# Patient Record
Sex: FEMALE | Race: WHITE | NOT HISPANIC OR LATINO | Employment: FULL TIME
[De-identification: names, ages, dates, MRNs, and addresses within clinical notes are randomized per-mention and may not be internally consistent; named-entity substitution may affect disease eponyms.]

---

## 2021-04-08 ENCOUNTER — TRANSCRIBE ORDERS (OUTPATIENT)
Dept: OTHER | Age: 66
End: 2021-04-08

## 2021-04-08 DIAGNOSIS — C54.1 ENDOMETRIAL CANCER (H): Primary | ICD-10-CM

## 2023-11-17 ENCOUNTER — TRANSCRIBE ORDERS (OUTPATIENT)
Dept: OTHER | Age: 68
End: 2023-11-17

## 2023-11-17 DIAGNOSIS — C54.1 ENDOMETRIAL CANCER (H): Primary | ICD-10-CM

## 2023-11-20 ENCOUNTER — PRE VISIT (OUTPATIENT)
Dept: ONCOLOGY | Facility: CLINIC | Age: 68
End: 2023-11-20
Payer: COMMERCIAL

## 2023-11-20 ENCOUNTER — PATIENT OUTREACH (OUTPATIENT)
Dept: ONCOLOGY | Facility: CLINIC | Age: 68
End: 2023-11-20
Payer: COMMERCIAL

## 2023-11-20 NOTE — TELEPHONE ENCOUNTER
RECORDS STATUS - ALL OTHER DIAGNOSIS    Referring Provider/Location:  Claudette Castillo Np, NP   Dx and Code: Endometrial cancer (H) [C54.1]   Appt Date:  12.28.23  Provider: Josette Mehta  Records Requested  TJ   Clinic name Comments/Action Taken   [facility] December 20, 2023 12:44 PM    Faxed request for pathology slides Case: [id number] and  Case: [id number]    Tracking #:[id number]   [facility] December 20, 2023 12:44 PM    Faxed request for all imaging to be pushed to PACS     Imaging Received  December 20, 2023  1:27 PM  NIKKI   Action: Images from [facility] received and resolved to PACS.  Call to Zachary in film room to resolve mammo     RECORDS RECEIVED FROM:     Appt Date: TBD NN WQ    Action    Action Taken 11/20/2023 8:42AM KEB     I faxed a request for imaging       NOTES STATUS DETAILS   OFFICE NOTE from referring provider Complete  Referred by CHRIS Castillo to Dr Josette Mehta for enlarging nodule, h/o endometrial cancer  P: [phone]  F: [phone]    OFFICE NOTE from medical oncologist Complete [facility]   DISCHARGE SUMMARY from hospital     DISCHARGE REPORT from the ER     CLINICAL TRIAL TREATMENTS TO DATE     LABS     PATHOLOGY REPORTS External Biopsy - [facility]  3/22/2021  Case: [id number]   A.  Uterus, cervix, bilateral fallopian tubes and ovaries (170 g), hysterectomy and bilateral salpingo-oophorectomy:  Endometrioid adenocarcinoma    1/27/2021  A.  Endometrium, endometrial curettings and polyp, curettage:    Endometrioid adenocarcinoma,   ANYTHING RELATED TO DIAGNOSIS Complete Labs last updated on 11/16/2023 in CE   GENONOMIC TESTING     TYPE:     IMAGING (NEED IMAGES & REPORT)     CT SCANS     MRI     MAMMO     ULTRASOUND Requested-   OBGYN Pelvic/GYN US 12/10/2020     OBGYN Pelvic/ GYN US 6/5/2017   PET

## 2023-11-20 NOTE — PROGRESS NOTES
New Patient Hematology / Oncology Nurse Navigator Note     Referral Date: 11/17/23    Referring provider: Claudette Castillo, TOM at     Referring Clinic/Organization: [facility] / [facility]      Referred to: GynOn    Requested provider (if applicable): Dr. Mehta, transfer of care from [facility]    Evaluation for : Endometrial Cancer     Clinical History (per Nurse review of records provided):    Appt today with TOM at  (pending)--BOOKMARKED  8/21/23 Office Visit with GynOnc TOM at -- BOOKMARKED  3/29/23 Office Visit with Dr. Mehta--BOOKMARKED  11/16/23 CT CAP:  IMPRESSION:   1. Minimal interval growth of a soft tissue nodule overlying the right psoas.   2.  Stable multiple pulmonary nodules without new or enlarging nodules.   3.  Diffuse hepatic steatosis. -- BOOKMARKED      Clinical Assessment / Barriers to Care (Per Nurse):  Pt lives in [place], [place]    Records Location: Care Everywhere     Records Needed:   Records from  per protocol (do NOT need to request path)    Additional testing needed prior to consult:   N/A    Referral updates and Plan:   OUTGOING CALL to pt:  Introduced my role as nurse navigator with [facility] Hematology/Oncology dept and that we have recd the referral for transfer of care from Dr. Mehta at  to [facility] for her Endometrial Cancer.    Pt confirms they are aware of the referral, but currently driving and would like a call back later to schedule.     -Explained to pt that they will receive a call from our scheduling intake team and provided our call-back number below if needed.          Aminata Martinez, BSN, RN, PHN, OCN  Hematology/Oncology Nurse Navigator  [facility]  [phone]

## 2023-12-26 PROBLEM — C54.1 ENDOMETRIAL CANCER (H): Status: ACTIVE | Noted: 2023-12-26

## 2023-12-26 NOTE — PATIENT INSTRUCTIONS
SCHEDULING:  -Abdomen, pelvic CT in 3 months. --already ordered by gyn onc NP Claudette Castillo.  -RTC in 3 months for surveillance (MD, in-person). 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  Treatment History:  -3/22/21: Pelvic exam under anesthesia, total laparoscopic hysterectomy (removal of uterus/cervix), bilateral salpingo-oophorectomy (removal of bilateral ovaries & fallopian tubes), attempted sentinel lymph node mapping (identification of the first lymph nodes to which the uterus drains), bilateral pelvic & para-aortic lymphadenectomy (lymph node sampling), pelvic washings.  -4/26/21-5/28/21: Adjuvant pelvic radiation therapy via intensity modulated radiation therapy.       PLAN:  1) Endometrial cancer: Overall the psoas lesion is stable (1 mm increase). Recommend continued surveillance. Plan as follows:  -RTC in 3 months with repeat PET/CT to evaluate the psoas lesion.   -Please call in the interim if you have worsening/new pelvic pain or other concerning symptoms.     2) Genetics: Tumor testing showed normal expression of mismatch repair proteins, making the genetic cancer syndrome Lee Syndrome unlikely.   -s/p Genetic counseling, genetic testing available if you choose.     3) Healthcare maintenance: Continue routine healthcare maintenance with your primary care provider.  -You are due for screening for colon cancer.   -You no longer need screening Pap/HPV tests since your cervix has been removed.       Please call with any questions or concerns. [phone]       Josette Mehta MD, MS, FACOG, FACS  12/29/2023  12:57 PM

## 2023-12-26 NOTE — PROGRESS NOTES
Consult Note  Gynecologic Oncology Clinic      Referring provider/Gyn Onc NP:    Claudette Castillo, NP  [address] [place] [address]      Primary Care Provider:  Aníbal Martin  [address]  [place] [address]       Medical Oncologist:   Srinivas Rosenberg MD  [address]  [address]      Radiation Oncologist:  Denise Townsend MD  [address]  [address]          Patient: Aileen Jin  : 1955  Language: English   Pronouns: she/her/hers       Date of Visit: Dec 29, 2023       Reason for visit: Stage IB, Grade 1 endometrial cancer. Follow-up psoas nodule.       History of Present Illness:  Aileen Jin is a 68 year old patient seen at the [facility] gyn onc clinic as a transfer of care from [facility] Gyn Onc clinic for management of endometrial cancer. Medical history significant for breast cancer, JOANN since . 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    17: Bilateral partial mastectomy.    : Adjuvant radiation to the left breast.    2017-22: Endocrine therapy with exemestane 25 mg daily.    2017-Present: JOANN.       Endometrial Cancer History:  3/22/21: Pelvic exam under anesthesia, total laparoscopic hysterectomy, bilateral salpingo-oophorectomy, attempted sentinel lymph node mapping, bilateral pelvic & para-aortic lymphadenectomy, pelvic washings.  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    21-21: Adjuvant pelvic radiation therapy via IMRT with total  dose 5000 cGy in 25 fractions.  -5/5/22: Abdomen, pelvic CT to evaluate RLQ discomfort: Indeterminate pelvic/psoas nodule.  -8/4/22: Chest, abdomen, pelvic CT: Stable nodule. I have personally reviewed the CT images.  LYMPH NODES: Stable 10 x 9 mm lymph node in the right lower quadrant anterior to the right psoas muscle.  MUSCULOSKELETAL: No suspicious osseous lesions. There is a spiculated soft tissue mass in the upper posterior left breast abutting the pectoralis major, measuring approximately 1.7 x 1.2 cm. No definite correlate seen on the comparison mammogram. There is skin thickening of the left breast which likely represents postradiation change.  -8/8/22: Left breast ultrasound:  Sonographic evaluation of the left upper breast was performed. At the 10:00 position, 7 cm from the nipple an irregular, hypoechoic area with associated posterior acoustic shadowing extends to the skin surface and is without associated internal vascularity. This finding is most consistent with postsurgical scar.  -11/16/22: PET/CT: Stable right psoas nodule with FDG-avidity concerning for recurrent, metastatic disease. I have personally reviewed the PET/CT images.  FDG avid nodule overlying the right psoas muscle measuring 0.9 x 0.6 cm (max SUV 6.8) suspicious for an isolated metastasis.  Mildly FDG avid left breast skin/subcutaneous thickening (max SUV 3.2) likely representing posttreatment inflammatory change.  -Discussed at the [facility] gyn onc tumor conference. Recommended surveillance without treatment given stability since 5/2022, and lack of symptoms related to the nodule.  -4/20/23: PET/CT: Decreasing right psoas nodule.   Decreased size/metabolic activity of the isolated soft tissue nodule overlying the right psoas muscle measuring 0.6 x 0.5 cm (max SUV 4.8, previously measured 0.9 x 0.6 cm with a max SUV of 6.8).   -8/2/23: Abdomen, pelvic CT: Stable psoas nodule.   MUSCULOSKELETAL: 1.0 x 0.7 cm soft tissue nodule immediately  anterior to the right psoas muscle (previously 1.0 x 0.6 cm).   -11/16/23: Chest, abdomen, pelvic CT: Stable to slightly increased psoas nodule. I have personally reviewed and interpreted the CT images.    LYMPH NODES: Minimal interval growth of a nodule sitting anterior to the right psoas muscle which measures 11 x 11 mm (previously 9 x 9 mm).     Genetic/Genomic Testing History:  3/22/21: Tumor IHC for mismatch repair proteins: pMMR: MSH2, MLH1, MSH6, PMS2=intact nuclear expression.        Subjective:  Aileen Jin presents to the gyn onc clinic today as a transfer of care from my [facility] clinic, unaccompanied.  Aileen reports feeling well overall. She has no pelvic/abdominal pain.   Pt. Is feeling generally well. She denies vaginal bleeding or discharge. She has had a discomfort on and off in her right lower abdomen for many years prior to her surgery with no new pain. Normal bowel and bladder habits. Her appetite is good. No cough, SOB or LE edema. She continues to work and is working from home.       Medical History:  Past Medical History:   Diagnosis Date    Breast cancer (H) 2017    IDC    Breast neoplasm, Tis (LCIS), right 05/24/2017    Compression fracture of thoracic vertebra (H)     Endometrial cancer (H) 03/22/2021    Stage IB, Grade 1 endometrioid adenocarcinoma    HTN (hypertension)          Surgical History:  Past Surgical History:   Procedure Laterality Date    BREAST BIOPSY, RT/LT Left 2014    Fibrocystic change    BREAST BIOPSY, RT/LT Left 2016    Fibrocystic change    BREAST BIOPSY, RT/LT Left 2017    [facility]    BREAST BIOPSY, RT/LT Right 2017    Papilloma and LCIS    BREAST CYST EXCISION Left 2015    BREAST CYST EXCISION Right 2017    Papilloma and LCIS    BREAST LUMPECTOMY, RT/LT Left 2017    [facility]    HYSTEROSCOPY      LAPAROSCOPIC HYSTERECTOMY TOTAL, SRI SALPINGO-OOPHORECTOMY, NODE DISSECTION, TUMOR STAGING, COMBINED  03/22/2021    For endometrial cancer: TLH, BSO, bilateral pelvic &  "para-aortic lymphadenectomy          Gynecologic History:  Menstrual/Menopause status: Menopause age 55y. S/p hysterectomy for endometrial cancer as per HPI.   Hormone therapy/Contraception status: none; history of AI as per HPI.   History of abnormal cervical cancer screening:  None.  History of STIS: None.  Current sexual activity status:  Sexually active.       Obstetric History:  OB History    Para Term  AB Living   5 2 2 0 3 2   SAB IAB Ectopic Multiple Live Births   0 0 0 0 2      # Outcome Date GA Lbr Bao/2nd Weight Sex Delivery Anes PTL Lv   5 AB            4 AB            3 AB            2 Term      Vag-Spont      1 Term      Vag-Spont             Healthcare Maintenance:   Last cervical cancer screenin/3/20 Result: AGC, hrHPV- due to endometrial cancer  Last breast cancer screenin23 Result: Benign  Last colon cancer screening: \"long time ago\" Result: Normal (per patient report)  HPV vaccination status: Unvaccinated      Medications:   Current Outpatient Medications   Medication    amLODIPine (NORVASC) 2.5 MG tablet    atenolol (TENORMIN) 50 MG tablet    ciprofloxacin (CILOXAN) 0.3 % ophthalmic solution    erythromycin (ROMYCIN) 5 MG/GM ophthalmic ointment    amLODIPine (NORVASC) 5 MG tablet    Multiple Vitamins-Minerals (PRESERVISION AREDS) CAPS     No current facility-administered medications for this visit.          Allergies:   No Known Allergies       Social History:  Patient lives with her .   Work status: . Activity: No activity limitations. Takes care of her young grandchildren.  Advanced Directives: None. Desired Healthcare Power of : her  Krystian Jin.  Social History     Tobacco Use    Smoking status: Never    Smokeless tobacco: Never   Substance Use Topics    Alcohol use: Not Currently    Drug use: Never          Family History:  Family history is significant for a first-degree relative with breast cancer.   No known family " history of ovarian, uterine, colon, urothelial/renal, prostate or pancreatic cancers.  No known family history of melanoma.   Family History   Problem Relation Age of Onset    Cancer Father         Eye cancer    Breast Cancer Sister          Physical Exam:   BP (!) 164/85 (BP Location: Right arm, Patient Position: Sitting, Cuff Size: Adult Large)   Pulse 64   Resp 16   Wt 96.5 kg (212 lb 12.8 oz)   SpO2 98%   There is no height or weight on file to calculate BMI.     General Appearance: healthy and alert, no distress     HEENT:  no thyromegaly, no palpable nodules or masses        Cardiovascular: regular rate and rhythm, no gallops, rubs or murmurs     Respiratory: lungs clear, no rales, rhonchi or wheezes, normal diaphragmatic excursion    Musculoskeletal: extremities non tender and without edema    Skin: no lesions or rashes     Neurological: normal gait, no gross defects     Psychiatric: appropriate mood and affect                               Hematological: normal cervical, supraclavicular and inguinal lymph nodes     Gastrointestinal:       abdomen soft, non-tender, non-distended, no organomegaly or masses    Genitourinary: External genitalia and urethral meatus appears normal.  Vagina is smooth without nodularity or masses.  Cervix absent.  Bimanual exam reveal no masses, nodularity or fullness.  Recto-vaginal exam confirms these findings.   Verbal consent obtained from the patient for the pelvic exam.  Each step of the exam was verbalized throughout.   Present for the exam: GILBERT Martínez.       Laboratory Examination:  12/18/23 CBC: WBC 7.4, hemoglobin 13.4, platelets 238.   12/18/23 Hemoglobin A1C: 6.5%  11/16/23 Creatinine: 0.7.   9/8/23 Liver panel: Albumin 3.7. ALT 64. Remainder of liver enzymes within normal limits.       Radiographic Examination:  I have independently reviewed & interpreted the 11/16/23 CT images detailed in the HPI.       Performance Status:  ECOG Grade 0.        Assessment:  Aileen Jin is a 68 year old patient with a diagnosis of Stage IB, Grade 1 endometrial cancer with a relatively stable right psoas nodule (increased 1 mm).     Medical history significant for breast cancer, JOANN, since 2017.       Plan:   1) Endometrial cancer: I reviewed the CT results with Aileen. Given the relative stability of the psoas lesion, and inability to biopsy the nodule by IR, recommend surveillance without intervention at this time.     Plan as follows:  -RTC in 3 months with repeat imaging with abdomen/pelvic CT at  prior to evaluate the psoas nodule (Discussed option of evaluation with PET/CT, but Aileen prefers CT which has already been ordered by  gyn onc NP Claudette Castillo).     2) Genetic risk factors assessed: s/p Genetic counseling with  genetic counselor Cony Sesay 9/23/21, Aileen declined genetic testing.     3) Labs/tests ordered: None; abdomen, pelvic CT previously ordered by  gyn onc NP Claudette Castillo.     4) Health maintenance issues addressed today: Continue routine healthcare maintenance with her PCP and breast cancer surveillance with oncologist Dr. Rosenberg.   -She is due for colon cancer screening.     5) Code status: Full-code.     6) Prescriptions: None.      A total of 30 minutes was spent with the patient, 25 minutes of which were spent in counseling/treatment planning; an additional 5 minutes was spent in chart review (including review of labs and radiographic images) and documentation.         Josette Mehta MD, MS, FACOG, FACS  12/29/2023  1:09 PM

## 2023-12-29 ENCOUNTER — ONCOLOGY VISIT (OUTPATIENT)
Dept: ONCOLOGY | Facility: CLINIC | Age: 68
End: 2023-12-29
Attending: NURSE PRACTITIONER
Payer: COMMERCIAL

## 2023-12-29 VITALS
RESPIRATION RATE: 16 BRPM | WEIGHT: 212.8 LBS | SYSTOLIC BLOOD PRESSURE: 164 MMHG | DIASTOLIC BLOOD PRESSURE: 85 MMHG | OXYGEN SATURATION: 98 % | HEART RATE: 64 BPM

## 2023-12-29 DIAGNOSIS — C54.1 ENDOMETRIAL CANCER (H): Primary | ICD-10-CM

## 2023-12-29 PROCEDURE — 99214 OFFICE O/P EST MOD 30 MIN: CPT | Performed by: OBSTETRICS & GYNECOLOGY

## 2023-12-29 PROCEDURE — 99203 OFFICE O/P NEW LOW 30 MIN: CPT | Performed by: OBSTETRICS & GYNECOLOGY

## 2023-12-29 RX ORDER — CIPROFLOXACIN HYDROCHLORIDE 3.5 MG/ML
1 SOLUTION/ DROPS TOPICAL
COMMUNITY
Start: 2023-12-18

## 2023-12-29 RX ORDER — AMLODIPINE BESYLATE 5 MG/1
TABLET ORAL
COMMUNITY

## 2023-12-29 RX ORDER — ATENOLOL 50 MG/1
50 TABLET ORAL DAILY
COMMUNITY
Start: 2023-09-08

## 2023-12-29 RX ORDER — VIT A/VIT C/VIT E/ZINC/COPPER 4296-226
1 CAPSULE ORAL 2 TIMES DAILY
COMMUNITY

## 2023-12-29 RX ORDER — ERYTHROMYCIN 5 MG/G
OINTMENT OPHTHALMIC
COMMUNITY
Start: 2023-12-18

## 2023-12-29 RX ORDER — AMLODIPINE BESYLATE 2.5 MG/1
2.5 TABLET ORAL
COMMUNITY
Start: 2023-09-08

## 2023-12-29 ASSESSMENT — PAIN SCALES - GENERAL: PAINLEVEL: NO PAIN (0)

## 2023-12-29 NOTE — LETTER
2023         RE: Aileen Jin  [id number] [address] [place] [id number]        Dear Colleague,    Thank you for referring your patient, Aileen Jin, to the [facility]. Please see a copy of my visit note below.    Consult Note  Gynecologic Oncology Clinic      Referring provider/Gyn Onc NP:    Claudette Castillo, CHRIS  [address] [place] [address]      Primary Care Provider:  Aníbal Martin  [address]  [place] [address]       Medical Oncologist:   Srinivas Rosenberg MD  [address]  [id number]      Radiation Oncologist:  [place] [address]          Patient: Aileen Jin  : 1955  Language: English   Pronouns: she/her/hers       Date of Visit: Dec 29, 2023       Reason for visit: Stage IB, Grade 1 endometrial cancer. Follow-up psoas nodule.       History of Present Illness:  Aileen Jin is a 68 year old patient seen at the [facility] gyn onc clinic as a transfer of care from [facility] Gyn Onc clinic for management of endometrial cancer. Medical history significant for breast cancer, JOANN since . History as follows:    Breast Cancer History:  2017: Diagnosed with invasive ductal carcinoma of the lower outer quadrant fo the left breast, Stage IA, pT1, pN0(sn), M0, R0, Grade 1, ER+, SC+, HER2/armand equivocal.    17: Bilateral partial mastectomy.    : Adjuvant radiation to the left breast.    2017-22: Endocrine therapy with exemestane 25 mg daily.    2017-Present: JOANN.       Endometrial Cancer History:  3/22/21: Pelvic exam under anesthesia, total laparoscopic hysterectomy, bilateral salpingo-oophorectomy, attempted sentinel lymph node mapping, bilateral pelvic & para-aortic lymphadenectomy, pelvic washings.  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    4/26/21-5/28/21: Adjuvant pelvic radiation therapy via IMRT with total dose 5000 cGy in 25 fractions.  -5/5/22: Abdomen, pelvic CT to evaluate RLQ discomfort: Indeterminate pelvic/psoas nodule.  -8/4/22: Chest, abdomen, pelvic CT: Stable nodule. I have personally reviewed the CT images.  LYMPH NODES: Stable 10 x 9 mm lymph node in the right lower quadrant anterior to the right psoas muscle.  MUSCULOSKELETAL: No suspicious osseous lesions. There is a spiculated soft tissue mass in the upper posterior left breast abutting the pectoralis major, measuring approximately 1.7 x 1.2 cm. No definite correlate seen on the comparison mammogram. There is skin thickening of the left breast which likely represents postradiation change.  -8/8/22: Left breast ultrasound:  Sonographic evaluation of the left upper breast was performed. At the 10:00 position, 7 cm from the nipple an irregular, hypoechoic area with associated posterior acoustic shadowing extends to the skin surface and is without associated internal vascularity. This finding is most consistent with postsurgical scar.  -11/16/22: PET/CT: Stable right psoas nodule with FDG-avidity concerning for recurrent, metastatic disease. I have personally reviewed the PET/CT images.  FDG avid nodule overlying the right psoas muscle measuring 0.9 x 0.6 cm (max SUV 6.8) suspicious for an isolated metastasis.  Mildly FDG avid left breast skin/subcutaneous thickening (max SUV 3.2) likely representing posttreatment inflammatory change.  -Discussed at the [facility] gyn onc tumor conference. Recommended surveillance without treatment given stability since 5/2022, and lack of symptoms related to the nodule.  -4/20/23: PET/CT: Decreasing right psoas nodule.   Decreased size/metabolic activity of the  isolated soft tissue nodule overlying the right psoas muscle measuring 0.6 x 0.5 cm (max SUV 4.8, previously measured 0.9 x 0.6 cm with a max SUV of 6.8).   -8/2/23: Abdomen, pelvic CT: Stable psoas nodule.   MUSCULOSKELETAL: 1.0 x 0.7 cm soft tissue nodule immediately anterior to the right psoas muscle (previously 1.0 x 0.6 cm).   -11/16/23: Chest, abdomen, pelvic CT: Stable to slightly increased psoas nodule. I have personally reviewed and interpreted the CT images.    LYMPH NODES: Minimal interval growth of a nodule sitting anterior to the right psoas muscle which measures 11 x 11 mm (previously 9 x 9 mm).     Genetic/Genomic Testing History:  3/22/21: Tumor IHC for mismatch repair proteins: pMMR: MSH2, MLH1, MSH6, PMS2=intact nuclear expression.        Subjective:  Aileen Jin presents to the gyn onc clinic today as a transfer of care from my [facility], unaccompanied.  Aileen reports feeling well overall. She has no pelvic/abdominal pain.   Pt. Is feeling generally well. She denies vaginal bleeding or discharge. She has had a discomfort on and off in her right lower abdomen for many years prior to her surgery with no new pain. Normal bowel and bladder habits. Her appetite is good. No cough, SOB or LE edema. She continues to work and is working from home.       Medical History:  Past Medical History:   Diagnosis Date     Breast cancer (H) 2017    IDC     Breast neoplasm, Tis (LCIS), right 05/24/2017     Compression fracture of thoracic vertebra (H)      Endometrial cancer (H) 03/22/2021    Stage IB, Grade 1 endometrioid adenocarcinoma     HTN (hypertension)          Surgical History:  Past Surgical History:   Procedure Laterality Date     BREAST BIOPSY, RT/LT Left 2014    Fibrocystic change     BREAST BIOPSY, RT/LT Left 2016    Fibrocystic change     BREAST BIOPSY, RT/LT Left 2017    IDC     BREAST BIOPSY, RT/LT Right 2017    Papilloma and LCIS     BREAST CYST EXCISION Left 2015     BREAST CYST  "EXCISION Right 2017    Papilloma and LCIS     BREAST LUMPECTOMY, RT/LT Left 2017    IDC     HYSTEROSCOPY       LAPAROSCOPIC HYSTERECTOMY TOTAL, SRI SALPINGO-OOPHORECTOMY, NODE DISSECTION, TUMOR STAGING, COMBINED  2021    For endometrial cancer: TLH, BSO, bilateral pelvic & para-aortic lymphadenectomy          Gynecologic History:  Menstrual/Menopause status: Menopause age 55y. S/p hysterectomy for endometrial cancer as per HPI.   Hormone therapy/Contraception status: none; history of AI as per HPI.   History of abnormal cervical cancer screening:  None.  History of STIS: None.  Current sexual activity status:  Sexually active.       Obstetric History:  OB History    Para Term  AB Living   5 2 2 0 3 2   SAB IAB Ectopic Multiple Live Births   0 0 0 0 2      # Outcome Date GA Lbr Bao/2nd Weight Sex Delivery Anes PTL Lv   5 AB            4 AB            3 AB            2 Term      Vag-Spont      1 Term      Vag-Spont             Healthcare Maintenance:   Last cervical cancer screenin/3/20 Result: AGC, hrHPV- due to endometrial cancer  Last breast cancer screenin23 Result: Benign  Last colon cancer screening: \"long time ago\" Result: Normal (per patient report)  HPV vaccination status: Unvaccinated      Medications:   Current Outpatient Medications   Medication     amLODIPine (NORVASC) 2.5 MG tablet     atenolol (TENORMIN) 50 MG tablet     ciprofloxacin (CILOXAN) 0.3 % ophthalmic solution     erythromycin (ROMYCIN) 5 MG/GM ophthalmic ointment     amLODIPine (NORVASC) 5 MG tablet     Multiple Vitamins-Minerals (PRESERVISION AREDS) CAPS     No current facility-administered medications for this visit.          Allergies:   No Known Allergies       Social History:  Patient lives with her .   Work status: . Activity: No activity limitations. Takes care of her young grandchildren.  Advanced Directives: None. Desired Healthcare Power of : her  Krystian " Carlossylvie.  Social History     Tobacco Use     Smoking status: Never     Smokeless tobacco: Never   Substance Use Topics     Alcohol use: Not Currently     Drug use: Never          Family History:  Family history is significant for a first-degree relative with breast cancer.   No known family history of ovarian, uterine, colon, urothelial/renal, prostate or pancreatic cancers.  No known family history of melanoma.   Family History   Problem Relation Age of Onset     Cancer Father         Eye cancer     Breast Cancer Sister          Physical Exam:   BP (!) 164/85 (BP Location: Right arm, Patient Position: Sitting, Cuff Size: Adult Large)   Pulse 64   Resp 16   Wt 96.5 kg (212 lb 12.8 oz)   SpO2 98%   There is no height or weight on file to calculate BMI.     General Appearance: healthy and alert, no distress     HEENT:  no thyromegaly, no palpable nodules or masses        Cardiovascular: regular rate and rhythm, no gallops, rubs or murmurs     Respiratory: lungs clear, no rales, rhonchi or wheezes, normal diaphragmatic excursion    Musculoskeletal: extremities non tender and without edema    Skin: no lesions or rashes     Neurological: normal gait, no gross defects     Psychiatric: appropriate mood and affect                               Hematological: normal cervical, supraclavicular and inguinal lymph nodes     Gastrointestinal:       abdomen soft, non-tender, non-distended, no organomegaly or masses    Genitourinary: External genitalia and urethral meatus appears normal.  Vagina is smooth without nodularity or masses.  Cervix absent.  Bimanual exam reveal no masses, nodularity or fullness.  Recto-vaginal exam confirms these findings.   Verbal consent obtained from the patient for the pelvic exam.  Each step of the exam was verbalized throughout.   Present for the exam: GILBERT Martínez.       Laboratory Examination:  12/18/23 CBC: WBC 7.4, hemoglobin 13.4, platelets 238.   12/18/23 Hemoglobin A1C: 6.5%  11/16/23  Creatinine: 0.7.   9/8/23 Liver panel: Albumin 3.7. ALT 64. Remainder of liver enzymes within normal limits.       Radiographic Examination:  I have independently reviewed & interpreted the 11/16/23 CT images detailed in the HPI.       Performance Status:  ECOG Grade 0.       Assessment:  Aileen Jin is a 68 year old patient with a diagnosis of Stage IB, Grade 1 endometrial cancer with a relatively stable right psoas nodule (increased 1 mm).     Medical history significant for breast cancer, JOANN, since 2017.       Plan:   1) Endometrial cancer: I reviewed the CT results with Aileen. Given the relative stability of the psoas lesion, and inability to biopsy the nodule by IR, recommend surveillance without intervention at this time.     Plan as follows:  -RTC in 3 months with repeat imaging with abdomen/pelvic CT at  prior to evaluate the psoas nodule (Discussed option of evaluation with PET/CT, but Aileen prefers CT which has already been ordered by  gyn onc NP Claudette Castillo).     2) Genetic risk factors assessed: s/p Genetic counseling with  genetic counselor Cony Sesay 9/23/21, Aileen declined genetic testing.     3) Labs/tests ordered: None; abdomen, pelvic CT previously ordered by  gyn onc NP Claudette Castillo.     4) Health maintenance issues addressed today: Continue routine healthcare maintenance with her PCP and breast cancer surveillance with oncologist Dr. Rosenberg.   -She is due for colon cancer screening.     5) Code status: Full-code.     6) Prescriptions: None.      A total of 30 minutes was spent with the patient, 25 minutes of which were spent in counseling/treatment planning; an additional 5 minutes was spent in chart review (including review of labs and radiographic images) and documentation.         Josette Mehta MD, MS, FACOG, FACS  12/29/2023  1:09 PM           Again, thank you for allowing me to participate in the care of your patient.        Sincerely,        Josette  Luciana Mehta MD

## 2023-12-29 NOTE — NURSING NOTE
Oncology Rooming Note    December 29, 2023 12:27 PM   Aileen Jin is a 68 year old female who presents for:    Chief Complaint   Patient presents with    Oncology Clinic Visit     Endometrial cancer     Initial Vitals: BP (!) 164/85 (BP Location: Right arm, Patient Position: Sitting, Cuff Size: Adult Large)   Pulse 64   Resp 16   Wt 96.5 kg (212 lb 12.8 oz)   SpO2 98%  There is no height or weight on file to calculate BMI. There is no height or weight on file to calculate BSA.  No Pain (0) Comment: Data Unavailable   No LMP recorded. Patient is postmenopausal.  Allergies reviewed: Yes  Medications reviewed: Yes    Medications: Medication refills not needed today.  Pharmacy name entered into [facility]:    [id number] [address] [address] [facility] #[id number] [address] AT [facility]    Frailty Screening:   Is the patient here for a new oncology consult visit in cancer care? 1. Yes. Over the past month, have you experienced difficulty or required a caregiver to assist with:   1. Balance, walking or general mobility (including any falls)? NO  2. Completion of self-care tasks such as bathing, dressing, toileting, grooming/hygiene?  NO  3. Concentration or memory that affects your daily life?  NO       Clinical concerns: None      Diana Mccollum